# Patient Record
Sex: FEMALE | Race: WHITE | NOT HISPANIC OR LATINO | Employment: FULL TIME | URBAN - METROPOLITAN AREA
[De-identification: names, ages, dates, MRNs, and addresses within clinical notes are randomized per-mention and may not be internally consistent; named-entity substitution may affect disease eponyms.]

---

## 2024-01-08 ENCOUNTER — OFFICE VISIT (OUTPATIENT)
Dept: ORTHOPEDIC SURGERY | Facility: CLINIC | Age: 49
End: 2024-01-08
Payer: COMMERCIAL

## 2024-01-08 VITALS — HEIGHT: 65 IN | BODY MASS INDEX: 38.65 KG/M2 | WEIGHT: 232 LBS

## 2024-01-08 DIAGNOSIS — M79.672 LEFT FOOT PAIN: Primary | ICD-10-CM

## 2024-01-08 PROCEDURE — 99204 OFFICE O/P NEW MOD 45 MIN: CPT | Performed by: ORTHOPAEDIC SURGERY

## 2024-01-08 RX ORDER — FLUOXETINE HYDROCHLORIDE 40 MG/1
1 CAPSULE ORAL 2 TIMES DAILY
COMMUNITY
Start: 2021-06-01

## 2024-01-08 RX ORDER — BACILLUS COAGULANS/INULIN 1B-250 MG
CAPSULE ORAL
COMMUNITY
Start: 2020-11-01

## 2024-01-08 RX ORDER — AMOXICILLIN 250 MG
CAPSULE ORAL
COMMUNITY

## 2024-01-08 NOTE — PROGRESS NOTES
NEW PATIENT    Patient: Yesenia Yost  : 1975    Primary Care Provider: Yesy Molina    Requesting Provider: As above    Pain of the Left Foot      History    Chief Complaint: Left foot pain    History of Present Illness: This is an extremely pleasant 48-year-old woman here for a second opinion regarding left forefoot pain.  I have reviewed her outside records from [place].  She was treated for a second webspace presumed neuroma.  She reports a 6 months or so history of pain that started in the left foot under the second metatarsal head.  She reports it started to feel as if she were walking on a rock.  It was worse barefoot better with padding.  She has had several steroid injections and notes that the toe has gotten more deformed and the pain has gotten worse.  She rates it as 5 out of 10.  She reports that the toe tends to sit dorsiflexed and rubs in her shoes.  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      The patient does not have a personal or family history of DVT, PE, hypercoagulable state or risk factors for clotting.        Current Outpatient Medications on File Prior to Visit   Medication Sig Dispense Refill    Bacillus Coagulans-Inulin (Probiotic) 1-250 BILLION-MG capsule       Calcium-Magnesium-Vitamin D (CALCIUM 1200+D3 PO)       Cobalamin Combinations (B-12) 100-5000 MCG sublingual tablet       FLUoxetine (PROzac) 40 MG capsule Take 1 capsule by mouth 2 (Two) Times a Day.       No current facility-administered medications on file prior to visit.      Allergies   Allergen Reactions    Sulfa Antibiotics Hives    Wound Dressing Adhesive Itching and Rash      History reviewed. No pertinent past medical history.  Past Surgical History:   Procedure Laterality Date    GALLBLADDER SURGERY      HYSTERECTOMY      KNEE SURGERY      Left knee    TONSILLECTOMY       Family History   Problem Relation Age of Onset    Cancer Father       Social History  "    Socioeconomic History    Marital status: Single   Tobacco Use    Smoking status: Never    Smokeless tobacco: Never   Vaping Use    Vaping Use: Never used   Substance and Sexual Activity    Alcohol use: Yes     Alcohol/week: 20.0 standard drinks of alcohol     Types: 20 Cans of beer per week    Drug use: Never    Sexual activity: Yes     Partners: Male     Birth control/protection: Hysterectomy        Review of Systems   Constitutional: Negative.    HENT: Negative.     Eyes: Negative.    Respiratory: Negative.     Cardiovascular: Negative.    Gastrointestinal: Negative.    Endocrine: Negative.    Genitourinary: Negative.    Musculoskeletal:  Positive for arthralgias.   Skin: Negative.    Allergic/Immunologic: Negative.    Neurological: Negative.    Hematological: Negative.    Psychiatric/Behavioral: Negative.         The following portions of the patient's history were reviewed and updated as appropriate: allergies, current medications, past family history, past medical history, past social history, past surgical history, and problem list.    Physical Exam:   Ht 165.1 cm (65\")   Wt 105 kg (232 lb)   BMI 38.61 kg/m²   GENERAL: Body habitus: obese    Lower extremity edema: Right: none; Left: none    Varicose veins:  Right: none; Left: none    Gait: antalgic with the first few steps     Mental Status:  awake and alert; oriented to person, place, and time    Voice:  clear  SKIN:  Lower extremity: Normal    Hair Growth(lower extremity):  Right:normal; Left:  normal  NAILS: Toenails: normal  HEENT: Head: Normocephalic, atraumatic,  without obvious abnormality.  eye: normal external eye, no icterus  ears:normal external ears  nose: normal external nose  PULM:  Repiratory effort normal  CV:  Dorsalis Pedis:  Right: 2+; Left:2+    Posterior Tibial: Right:2+; Left:2+    Capillary Refill:  Brisk  MSK:        Tibia:  Right:  non tender; Left:  non tender      Ankle:  Right: non tender; Left:  non tender      Foot:  Right:  " non tender; Left:   Left second toe is very tender at the MTPJ.  The MTPJ is unstable.  The toe sits perched on the top of the second metatarsal that is just barely reducible, but does not stay in place.  It has about a 30 degree flexion contracture at the PIP joint.  She has splaying between the second and third toes.  No other tenderness.      NEURO: Heel Walking:  Right:  normal; Left:  normal    Toe Walking:  Right:  normal; Left:  limited ability, painful     Wilton-Pam 5.07 monofilament test:  A few patchy areas 1 under the first metatarsal on the right foot there was subjectively decreased in 1 under the second metatarsal on the left foot there was subjectively decreased    Lower extremity sensation: Slight decreased subjectively bilaterally under the right first MTP and left second        Motor Function: all 5/5         Medical Decision Making    Data Review:   ordered and reviewed x-rays today and reviewed outside records    Assessment and Plan/ Diagnosis/Treatment options:   1. Left foot pain  As above I reviewed the outside records.  They were treating a neuroma.  However I explained I do not think her diagnosis is a neuroma.  I explained that I think she had synovitis of the second MTPJ.  I explained that is how hammertoes start.  They can go on to deform and dislocate.  I explained neuroma symptoms are usually much worse in a shoe and better barefoot and they do not to report pain on the bottom of the foot.  She notes that she also now has some stinging and burning pain I explained that is due to irritation of the nerves but again I do not think this is a neuroma problem.  The joint is now unstable and sits in a subluxed position.  On the lateral view you can see the proximal phalanx actually sits perched on the top of the second metatarsal head.  We talked about the options.  I got a second x-ray with a Budin splint on and it did not hold the toe reduced.  Therefore I do not think there is much  conservative treatment that we can do to keep the toe in place.  However I also explained to her that it is not absolutely necessary that she have surgery.  If her symptoms decrease then the toe can be left as it is.  It is possible that just wearing a Budin splint might help it feel better enough that the pain can gradually improve.  However if it does not the option would be surgical.  But I would do surgically is excised the second toe PIP joint I would do a metatarsal capsulotomy with extensor lengthening and flexor to extensor transfer and osteotomy of the metatarsal.  The reason I would have the osteotomy is I think the cartilage is going to be damaged from having the toes subluxed for this length of time and I do not think that the toe will stay in place without decompressing the joint and decompressing the joint will help slow down arthritic change.  I explained I would never talk her into surgery.  We also discussed getting other opinions.  If she does decide to do surgery she would be nonweightbearing 6 to 8 weeks then in a walking boot 6 to 8 weeks.  I would leave the pin in 6 weeks.  I explained how I remove the pin.   The toe will never be normal.  The goal is a stiffer straighter toe with less pain.  They are never pretty.  Sometimes they (bend side-to-side or even bend backwards.  I explained that surgery is done as an outpatient.  We discussed the risks including but not limited to: death, infection, neurovascular damage, strokes, heart attacks, blood clots, chronic pain, deformity, stiffness, malunion, nonunion, hardware failure, need for further surgery,  amputation, etc.  Questions were asked and answered in detail.  We also discussed what would happen if we do nothing.  She is going to think about her options.  I be happy to see her anytime    - XR Foot 2 View Left            Part of this encounter note is an electronic transcription/translation of spoken language to printed text. The electronic  translation of spoken language may permit erroneous, or at times, nonsensical words or phrases to be inadvertently transcribed; Although I have reviewed the note for such errors, some may still exist.    NOTE TO PATIENT: The 21st Century Cures Act makes medical notes like these available to patients in the interest of transparency. However, be advised this is a medical document. It is intended as peer to peer communication. It is written in medical language and may contain abbreviations or verbiage that are unfamiliar. It may appear blunt or direct. Medical documents are intended to carry relevant information, facts as evident, and the clinical opinion of the practitioner.       Brunilda Reagan MD